# Patient Record
Sex: FEMALE | Employment: FULL TIME | URBAN - METROPOLITAN AREA
[De-identification: names, ages, dates, MRNs, and addresses within clinical notes are randomized per-mention and may not be internally consistent; named-entity substitution may affect disease eponyms.]

---

## 2017-06-21 PROCEDURE — 87086 URINE CULTURE/COLONY COUNT: CPT | Performed by: EMERGENCY MEDICINE

## 2018-11-13 PROBLEM — M25.521 RIGHT ELBOW PAIN: Status: ACTIVE | Noted: 2018-11-13

## 2018-12-04 PROBLEM — M77.11 LATERAL EPICONDYLITIS OF RIGHT ELBOW: Status: ACTIVE | Noted: 2018-12-04

## 2019-01-03 ENCOUNTER — TELEPHONE (OUTPATIENT)
Dept: NEUROLOGY | Facility: CLINIC | Age: 54
End: 2019-01-03

## 2019-01-03 NOTE — TELEPHONE ENCOUNTER
Called patient and left detailed voicemail on verified number (OK per HIPPA). Informed patient that provider can see images and patient should keep her appointment. Encouraged patient to call SONJA with any further needs.

## 2019-01-03 NOTE — TELEPHONE ENCOUNTER
Patient called and states she is having trouble getting MRI disk from [facility]. Patient would like to know if provider is able to access enough information for patient to keep appointment.

## 2019-01-04 NOTE — TELEPHONE ENCOUNTER
Spoke with patient, states she would like to know if she needs to bring in CD and report of MRI brain, informed patient MRI results/images are in Epic and no need to bring them in.   Would also like to know if she is prescribed a cholesterol medication by h

## 2019-01-07 ENCOUNTER — OFFICE VISIT (OUTPATIENT)
Dept: NEUROLOGY | Facility: CLINIC | Age: 54
End: 2019-01-07
Payer: COMMERCIAL

## 2019-01-07 VITALS
SYSTOLIC BLOOD PRESSURE: 122 MMHG | BODY MASS INDEX: 22 KG/M2 | HEART RATE: 68 BPM | DIASTOLIC BLOOD PRESSURE: 70 MMHG | WEIGHT: 122 LBS | RESPIRATION RATE: 16 BRPM

## 2019-01-07 DIAGNOSIS — G44.209 TENSION HEADACHE: ICD-10-CM

## 2019-01-07 DIAGNOSIS — R90.89 ABNORMAL BRAIN MRI: ICD-10-CM

## 2019-01-07 DIAGNOSIS — R41.3 SHORT-TERM MEMORY LOSS: ICD-10-CM

## 2019-01-07 DIAGNOSIS — R42 INTERMITTENT LIGHTHEADEDNESS: Primary | ICD-10-CM

## 2019-01-07 DIAGNOSIS — F51.04 PSYCHOPHYSIOLOGICAL INSOMNIA: ICD-10-CM

## 2019-01-07 DIAGNOSIS — R20.2 PARESTHESIA OF LOWER LIP: ICD-10-CM

## 2019-01-07 PROCEDURE — 99244 OFF/OP CNSLTJ NEW/EST MOD 40: CPT | Performed by: OTHER

## 2019-01-07 RX ORDER — CHOLECALCIFEROL (VITAMIN D3) 125 MCG
500 CAPSULE ORAL DAILY
COMMUNITY

## 2019-01-07 RX ORDER — NORTRIPTYLINE HYDROCHLORIDE 10 MG/1
CAPSULE ORAL
Qty: 60 CAPSULE | Refills: 1 | Status: SHIPPED | OUTPATIENT
Start: 2019-01-07 | End: 2019-04-11

## 2019-01-07 NOTE — PROGRESS NOTES
[facility] [id number]   Neurology- INITIAL CLINIC VISIT  2019, 9:06 AM     Jessica Villegas Patient Status:  No patient class for patient encounter    10/5/1965 MRN [id number]   [facility] Latest Ref Rng & Units 12/6/2018   TSH      0.350 - 5.500 mIU/L 0.723   Vitamin B12      193 - 986 pg/mL 289   FOLATE (FOLIC ACID), SERUM      8.7 - 24.0 ng/mL 17.5       Past Medical History:  Past Medical History:   Diagnosis Date   • Achalasia    • Clos    Pertinent positives and negatives noted in the HPI.          PHYSICAL EXAM:   Neurologic Exam  Vitals   01/07/19  0853   BP: 122/70   Pulse: 68   Resp: 16     General Appearance: Patient is a 52year old female in no acute distress  Cardiac: Normal rate Recommended to increase food content in snacks, even if she is going to continue to eat 1-2 main meals a day. Increase fluid intake, and very minimally salt intake, if her BP chronically runs low (in clinic usually a little higher).  Also recommended sienna

## 2019-01-07 NOTE — PATIENT INSTRUCTIONS
Refill policies:    • Allow 2-3 business days for refills; controlled substances may take longer.   • Contact your pharmacy at least 5 days prior to running out of medication and have them send an electronic request or submit request through the “request re entire amount billed. Precertification and Prior Authorizations: If your physician has recommended that you have a procedure or additional testing performed.   [facility]) will contact your insurance carrier to obtain pre-certi

## 2019-01-07 NOTE — PROGRESS NOTES
The patient is here to discuss the possibility of having MS. The patient states she has dizziness, poor balance and numbness in the lips on and off.

## 2019-02-22 PROCEDURE — 88305 TISSUE EXAM BY PATHOLOGIST: CPT | Performed by: ORTHOPAEDIC SURGERY

## 2019-04-11 DIAGNOSIS — R42 INTERMITTENT LIGHTHEADEDNESS: ICD-10-CM

## 2019-04-11 DIAGNOSIS — R42 INTERMITTENT LIGHTHEADEDNESS: Primary | ICD-10-CM

## 2019-04-11 DIAGNOSIS — E53.8 B12 DEFICIENCY: Primary | ICD-10-CM

## 2019-04-11 RX ORDER — NORTRIPTYLINE HYDROCHLORIDE 10 MG/1
CAPSULE ORAL
Qty: 60 CAPSULE | Refills: 0 | Status: SHIPPED | OUTPATIENT
Start: 2019-04-11 | End: 2019-04-11

## 2019-04-11 NOTE — TELEPHONE ENCOUNTER
Medication: NORTRIPTYLINE HCL 10 MG Oral Cap    Date of last refill: 01/07/19 (#60/1)  [place]    Last office visit: 1/7/2019  Due back to clinic per last office note:  Around 03/07/19  Date next office visit schedul

## 2019-04-12 RX ORDER — NORTRIPTYLINE HYDROCHLORIDE 10 MG/1
CAPSULE ORAL
Qty: 180 CAPSULE | Refills: 0 | Status: SHIPPED | OUTPATIENT
Start: 2019-04-12 | End: 2019-08-27

## 2019-04-12 NOTE — TELEPHONE ENCOUNTER
Spoke with patient about need for f/u emery't before refill can be processsed. Patient transferred to  to set up emery't. Also reminded patient that Dr Lakesha Reddy wanted B12 level done at this time. B12 order sent to lab per Dr instruction notes below. as above    Return in about 2 months (around 3/7/2019).

## 2019-05-07 PROBLEM — M77.11 LATERAL EPICONDYLITIS OF RIGHT ELBOW: Status: RESOLVED | Noted: 2018-12-04 | Resolved: 2019-05-07

## 2019-05-07 PROBLEM — M25.521 RIGHT ELBOW PAIN: Status: RESOLVED | Noted: 2018-11-13 | Resolved: 2019-05-07

## 2019-05-07 PROBLEM — E03.9 HYPOTHYROID: Status: ACTIVE | Noted: 2019-05-07

## 2019-05-07 PROBLEM — E78.00 HYPERCHOLESTEROLEMIA: Status: ACTIVE | Noted: 2019-05-07

## 2019-05-17 ENCOUNTER — OFFICE VISIT (OUTPATIENT)
Dept: NEUROLOGY | Facility: CLINIC | Age: 54
End: 2019-05-17
Payer: COMMERCIAL

## 2019-05-17 VITALS
BODY MASS INDEX: 26 KG/M2 | HEART RATE: 90 BPM | SYSTOLIC BLOOD PRESSURE: 116 MMHG | DIASTOLIC BLOOD PRESSURE: 76 MMHG | RESPIRATION RATE: 14 BRPM | WEIGHT: 141 LBS

## 2019-05-17 DIAGNOSIS — R42 INTERMITTENT LIGHTHEADEDNESS: Primary | ICD-10-CM

## 2019-05-17 DIAGNOSIS — R41.3 SHORT-TERM MEMORY LOSS: ICD-10-CM

## 2019-05-17 DIAGNOSIS — F51.04 PSYCHOPHYSIOLOGICAL INSOMNIA: ICD-10-CM

## 2019-05-17 PROCEDURE — 99213 OFFICE O/P EST LOW 20 MIN: CPT | Performed by: OTHER

## 2019-05-17 NOTE — PROGRESS NOTES
Pt states she feels like medication is keeping her awake at nights. Pt states that left frontal mass is enlarging.

## 2019-05-17 NOTE — PROGRESS NOTES
[facility]   Neurology- f/u    Vinicio Lawson Patient Status:  No patient class for patient encounter    10/5/1965 MRN [id number]   [facility] 2. Mild degree of nonspecific T-2/FLAIR white matter hyperintensities which most commonly represent  chronic microvascular ischemic disease in a patient of this age.     Component      Latest Ref Rng & Units 12/6/2018   TSH      0.350 - 5.500 mIU/L 0.723 1-2 puffs every 4-6 hours as needed. , Disp: 1 Inhaler, Rfl: 1  •  Vitamin B-12 500 MCG Oral Tab, Take 500 mcg by mouth daily. , Disp: , Rfl:       Review of Systems:   A comprehensive 10 point review of systems was completed.     Pertinent positives and neg loss      Discussion/Plan:  Intermittent lightheadedness- main consideration is due orthostatic intolerance due to chronic low blood pressure superimposed with poor eating habits. Can increase salt intake a little. Get up slowly.  Consider compression stock

## 2019-05-22 ENCOUNTER — TELEPHONE (OUTPATIENT)
Dept: NEUROLOGY | Facility: CLINIC | Age: 54
End: 2019-05-22

## 2019-06-18 DIAGNOSIS — R41.3 SHORT-TERM MEMORY LOSS: Primary | ICD-10-CM

## 2019-06-18 PROCEDURE — 86803 HEPATITIS C AB TEST: CPT | Performed by: INTERNAL MEDICINE

## 2019-06-28 DIAGNOSIS — R42 INTERMITTENT LIGHTHEADEDNESS: ICD-10-CM

## 2019-07-01 NOTE — TELEPHONE ENCOUNTER
Patient was advised to stop this medication.      Medication: NORTRIPTYLINE HCL 10 MG Oral Cap    Date of last refill: 04/12/19 (#180/0)  [facility]    Last office visit: 5/17/2019  Due back to clinic per last office not

## 2019-07-03 RX ORDER — NORTRIPTYLINE HYDROCHLORIDE 10 MG/1
CAPSULE ORAL
Qty: 180 CAPSULE | Refills: 0 | OUTPATIENT
Start: 2019-07-03

## 2019-07-03 NOTE — PROGRESS NOTES
Spoke with pharmacy, pharmacy stated Nortriptyline put on automatic refill. Informed pharmacy that per Dr. Jesus Rosado notes on 05/17/2019 pt to stop medication.     This note belongs with refill TE dated 06/28/2019

## 2019-08-21 ENCOUNTER — TELEPHONE (OUTPATIENT)
Dept: NEUROLOGY | Facility: CLINIC | Age: 54
End: 2019-08-21

## 2019-08-27 ENCOUNTER — TELEPHONE (OUTPATIENT)
Dept: NEUROLOGY | Facility: CLINIC | Age: 54
End: 2019-08-27

## 2019-08-27 DIAGNOSIS — R42 INTERMITTENT LIGHTHEADEDNESS: ICD-10-CM

## 2019-08-27 RX ORDER — NORTRIPTYLINE HYDROCHLORIDE 10 MG/1
CAPSULE ORAL
Qty: 180 CAPSULE | Refills: 0 | Status: SHIPPED | OUTPATIENT
Start: 2019-08-27 | End: 2020-03-16 | Stop reason: ALTCHOICE

## 2019-08-27 NOTE — TELEPHONE ENCOUNTER
Patient requesting refill on Nortriptyline.  Patient states she has been off of it like you told her to and her headaches are increasing and she is also under a lot of stress (helping neighbor who was just Dx'd with cancer after her  passed away 2 mo

## 2019-10-11 ENCOUNTER — OFFICE VISIT (OUTPATIENT)
Dept: NEUROLOGY | Facility: CLINIC | Age: 54
End: 2019-10-11
Payer: COMMERCIAL

## 2019-10-11 VITALS
HEART RATE: 88 BPM | SYSTOLIC BLOOD PRESSURE: 115 MMHG | BODY MASS INDEX: 25.4 KG/M2 | RESPIRATION RATE: 16 BRPM | DIASTOLIC BLOOD PRESSURE: 73 MMHG | HEIGHT: 62 IN | WEIGHT: 138 LBS

## 2019-10-11 DIAGNOSIS — G44.209 TENSION HEADACHE: Primary | ICD-10-CM

## 2019-10-11 DIAGNOSIS — R42 INTERMITTENT LIGHTHEADEDNESS: ICD-10-CM

## 2019-10-11 DIAGNOSIS — E53.8 B12 DEFICIENCY: ICD-10-CM

## 2019-10-11 DIAGNOSIS — R41.3 SHORT-TERM MEMORY LOSS: ICD-10-CM

## 2019-10-11 DIAGNOSIS — F51.04 PSYCHOPHYSIOLOGICAL INSOMNIA: ICD-10-CM

## 2019-10-11 PROCEDURE — 99213 OFFICE O/P EST LOW 20 MIN: CPT | Performed by: OTHER

## 2019-10-11 RX ORDER — NORTRIPTYLINE HYDROCHLORIDE 25 MG/1
25 CAPSULE ORAL NIGHTLY
Qty: 270 CAPSULE | Refills: 1 | Status: SHIPPED | OUTPATIENT
Start: 2019-10-11

## 2019-10-11 NOTE — PROGRESS NOTES
[facility]   Neurology- f/u    Brie Lawson Patient Status:  No patient class for patient encounter    10/5/1965 MRN [id number]   Location [address] [place] [address] PCP Yusef Garcia MD 12/29/18  IMPRESSION:  1. Slight asymmetry of the temporal horns of the lateral ventricles without associated discrete mass  effect or suspicious postcontrast enhancement, likely representing a normal variant.   If the patient  has seizures, then a dedicate Comment:Chest pain    MEDICATIONS:    Current Outpatient Medications:   •  Nortriptyline HCl 10 MG Oral Cap, TAKE 2 CAPSULES BY MOUTH AT BEDTIME, Disp: 180 capsule, Rfl: 0  •  atorvastatin 40 MG Oral Tab, Take 1 tablet (40 mg total) by mouth daily. , Disp: normal light touch perception. Vibratory perception and proprioception were intact at the toes. Pinprick and temperature were normal. Romberg sign was absent. Complex motor skills revealed normal coordination. Finger-nose-finger intact.    Gait was narrow

## 2020-01-07 ENCOUNTER — TELEPHONE (OUTPATIENT)
Dept: NEUROLOGY | Facility: CLINIC | Age: 55
End: 2020-01-07

## 2020-08-18 NOTE — TELEPHONE ENCOUNTER
LMTCB and schedule an appt. Also to clarify how the patient is taking the medication.      Medication: NORTRIPTYLINE HCL 25 MG Oral Cap    Date of last refill: Around 10/11/19 (#270/1)  [place]    Last office visit: 1

## 2020-09-30 RX ORDER — NORTRIPTYLINE HYDROCHLORIDE 25 MG/1
CAPSULE ORAL
Qty: 270 CAPSULE | Refills: 1 | OUTPATIENT
Start: 2020-09-30

## 2021-04-23 RX ORDER — NORTRIPTYLINE HYDROCHLORIDE 25 MG/1
CAPSULE ORAL
Qty: 270 CAPSULE | Refills: 1 | OUTPATIENT
Start: 2021-04-23

## 2021-04-23 NOTE — TELEPHONE ENCOUNTER
DEBBIE on VM (ok per HIPAA consent) to schedule a f/u emery't. Refill denied.     Medication: NORTRIPTYLINE HCL 25 MG     Date of last refill: 10/11/19 (#270/1)  [place]     Last office visit: 10/11/2019  Due back to clinic
No
